# Patient Record
Sex: MALE | Race: WHITE
[De-identification: names, ages, dates, MRNs, and addresses within clinical notes are randomized per-mention and may not be internally consistent; named-entity substitution may affect disease eponyms.]

---

## 2017-12-29 ENCOUNTER — HOSPITAL ENCOUNTER (OUTPATIENT)
Age: 57
End: 2017-12-29
Payer: COMMERCIAL

## 2017-12-29 DIAGNOSIS — R94.31: ICD-10-CM

## 2017-12-29 DIAGNOSIS — I10: Primary | ICD-10-CM

## 2017-12-29 DIAGNOSIS — R06.02: Primary | ICD-10-CM

## 2017-12-29 DIAGNOSIS — I10: ICD-10-CM

## 2017-12-29 PROCEDURE — 93017 CV STRESS TEST TRACING ONLY: CPT

## 2017-12-29 PROCEDURE — 93306 TTE W/DOPPLER COMPLETE: CPT

## 2018-08-07 ENCOUNTER — HOSPITAL ENCOUNTER (OUTPATIENT)
Age: 58
End: 2018-08-07
Payer: COMMERCIAL

## 2018-08-07 DIAGNOSIS — R05: Primary | ICD-10-CM

## 2018-08-07 PROCEDURE — 71046 X-RAY EXAM CHEST 2 VIEWS: CPT

## 2018-09-10 ENCOUNTER — ON CAMPUS - OUTPATIENT (OUTPATIENT)
Dept: RURAL HOSPITAL 6 | Facility: HOSPITAL | Age: 58
End: 2018-09-10

## 2018-09-10 DIAGNOSIS — Z12.11 ENCOUNTER FOR SCREENING FOR MALIGNANT NEOPLASM OF COLON: ICD-10-CM

## 2018-09-10 DIAGNOSIS — K64.0 FIRST DEGREE HEMORRHOIDS: ICD-10-CM

## 2018-09-10 DIAGNOSIS — K63.5 POLYP OF COLON: ICD-10-CM

## 2018-09-10 DIAGNOSIS — K57.90 DIVERTICULOSIS OF INTESTINE, PART UNSPECIFIED, WITHOUT PERFO: ICD-10-CM

## 2018-09-10 PROCEDURE — 45385 COLONOSCOPY W/LESION REMOVAL: CPT | Mod: 33 | Performed by: INTERNAL MEDICINE

## 2023-05-16 ENCOUNTER — HOSPITAL ENCOUNTER (OUTPATIENT)
Age: 63
End: 2023-05-16
Payer: COMMERCIAL

## 2023-05-16 DIAGNOSIS — M54.50: Primary | ICD-10-CM

## 2023-05-16 DIAGNOSIS — M54.32: ICD-10-CM

## 2023-05-16 PROCEDURE — 76376 3D RENDER W/INTRP POSTPROCES: CPT

## 2023-05-16 PROCEDURE — 72148 MRI LUMBAR SPINE W/O DYE: CPT

## 2023-06-14 ENCOUNTER — PREP FOR SURGERY (OUTPATIENT)
Dept: OTHER | Facility: HOSPITAL | Age: 63
End: 2023-06-14
Payer: COMMERCIAL

## 2023-06-14 ENCOUNTER — OFFICE VISIT (OUTPATIENT)
Dept: NEUROSURGERY | Facility: CLINIC | Age: 63
End: 2023-06-14
Payer: COMMERCIAL

## 2023-06-14 VITALS — HEIGHT: 69 IN | TEMPERATURE: 97.3 F | BODY MASS INDEX: 33.8 KG/M2 | WEIGHT: 228.2 LBS

## 2023-06-14 DIAGNOSIS — M48.062 SPINAL STENOSIS OF LUMBAR REGION WITH NEUROGENIC CLAUDICATION: Primary | ICD-10-CM

## 2023-06-14 DIAGNOSIS — M48.062 LUMBAR STENOSIS WITH NEUROGENIC CLAUDICATION: Primary | ICD-10-CM

## 2023-06-14 DIAGNOSIS — M51.36 DDD (DEGENERATIVE DISC DISEASE), LUMBAR: ICD-10-CM

## 2023-06-14 PROCEDURE — 99204 OFFICE O/P NEW MOD 45 MIN: CPT | Performed by: NEUROLOGICAL SURGERY

## 2023-06-14 RX ORDER — CEFAZOLIN SODIUM 2 G/100ML
2 INJECTION, SOLUTION INTRAVENOUS ONCE
OUTPATIENT
Start: 2023-06-14 | End: 2023-06-14

## 2023-06-14 RX ORDER — OXYCODONE HCL 10 MG/1
10 TABLET, FILM COATED, EXTENDED RELEASE ORAL ONCE
OUTPATIENT
Start: 2023-06-14 | End: 2023-06-14

## 2023-06-14 RX ORDER — IBUPROFEN 200 MG
200 TABLET ORAL EVERY 6 HOURS
COMMUNITY

## 2023-06-14 RX ORDER — CHLORHEXIDINE GLUCONATE 4 G/100ML
SOLUTION TOPICAL
Qty: 120 ML | Refills: 0 | Status: SHIPPED | OUTPATIENT
Start: 2023-06-14

## 2023-06-14 RX ORDER — FAMOTIDINE 20 MG/1
20 TABLET, FILM COATED ORAL
OUTPATIENT
Start: 2023-06-14

## 2023-06-14 RX ORDER — SILDENAFIL CITRATE 20 MG/1
20 TABLET ORAL
COMMUNITY

## 2023-06-14 RX ORDER — ACETAMINOPHEN 500 MG
1000 TABLET ORAL ONCE
OUTPATIENT
Start: 2023-06-14 | End: 2023-06-14

## 2023-06-14 RX ORDER — IBUPROFEN 800 MG/1
800 TABLET ORAL ONCE
OUTPATIENT
Start: 2023-06-14 | End: 2023-06-14

## 2023-06-14 RX ORDER — OMEPRAZOLE 20 MG/1
CAPSULE, DELAYED RELEASE ORAL EVERY 24 HOURS
COMMUNITY

## 2023-06-14 NOTE — H&P
Patient: Chris Enciso  : 1960     Primary Care Provider: Bethany Jones MD     Requesting Provider: As above           History     Chief Complaint: Low back and bilateral lower extremity pain with walking and standing intolerance.     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  In  he underwent some sort of lumbar intervention by Dr. Wilber Calderon.  He has had occasional low back pain.  From December through March of this year he had a severe cough.  He also traveled a long distance by car and then returned.  By the next day on  of this year he had profound pain in his back extending into his legs.  Symptoms extend into the posterior lateral aspects of his calves to the ankles.  He has done several weeks of physical therapy to no avail.  He is better sitting, lying down, leaning forward.  He is worse with standing or walking.  Occasionally has some numbness in his legs or in his left hand.  He has taken Tylenol.  Patient is very limited in his struggling.     Review of Systems   Constitutional:  Negative for activity change, appetite change, chills, diaphoresis, fatigue, fever and unexpected weight change.   HENT:  Negative for congestion, dental problem, drooling, ear discharge, ear pain, facial swelling, hearing loss, mouth sores, nosebleeds, postnasal drip, rhinorrhea, sinus pressure, sinus pain, sneezing, sore throat, tinnitus, trouble swallowing and voice change.    Eyes:  Negative for photophobia, pain, discharge, redness, itching and visual disturbance.   Respiratory:  Negative for apnea, cough, choking, chest tightness, shortness of breath, wheezing and stridor.    Cardiovascular:  Negative for chest pain, palpitations and leg swelling.   Gastrointestinal:  Negative for abdominal distention, abdominal pain, anal bleeding, blood in stool, constipation, diarrhea, nausea, rectal pain and vomiting.   Endocrine: Negative for  cold intolerance, heat intolerance, polydipsia, polyphagia and polyuria.   Genitourinary:  Negative for decreased urine volume, difficulty urinating, dysuria, enuresis, flank pain, frequency, genital sores, hematuria, penile discharge, penile pain, penile swelling, scrotal swelling, testicular pain and urgency.   Musculoskeletal:  Positive for back pain. Negative for arthralgias, gait problem, joint swelling, myalgias, neck pain and neck stiffness.   Skin:  Negative for color change, pallor, rash and wound.   Allergic/Immunologic: Negative for environmental allergies, food allergies and immunocompromised state.   Neurological:  Negative for dizziness, tremors, seizures, syncope, facial asymmetry, speech difficulty, weakness, light-headedness, numbness and headaches.   Hematological:  Negative for adenopathy. Does not bruise/bleed easily.   Psychiatric/Behavioral:  Negative for agitation, behavioral problems, confusion, decreased concentration, dysphoric mood, hallucinations, self-injury, sleep disturbance and suicidal ideas. The patient is not nervous/anxious and is not hyperactive.       The patient's past medical history, past surgical history, family history, and social history have been reviewed at length in the electronic medical record.  Past Medical History:   Diagnosis Date   • Low back pain 1998    Most severe March 17 2023   • Lumbosacral disc disease 1998     Past Surgical History:   Procedure Laterality Date   • BACK SURGERY  1998 1998 - Dr. Calderon?     Family History   Problem Relation Age of Onset   • Diabetes Mother    • Stroke Father      Social History     Socioeconomic History   • Marital status:    Tobacco Use   • Smoking status: Never   Substance and Sexual Activity   • Alcohol use: Not Currently     Alcohol/week: 4.0 standard drinks     Types: 2 Cans of beer, 2 Shots of liquor per week   • Drug use: Never   • Sexual activity: Yes     Partners: Female     Birth control/protection:  "Hysterectomy           Allergies   Allergen Reactions   • Morphine Itching       Current Outpatient Medications on File Prior to Visit   Medication Sig Dispense Refill   • ibuprofen (ADVIL,MOTRIN) 200 MG tablet 1 tablet Every 6 (Six) Hours.     • omeprazole (priLOSEC) 20 MG capsule Daily.     • sildenafil (REVATIO) 20 MG tablet 1 tablet.       No current facility-administered medications on file prior to visit.            Physical Exam:   Temp 97.3 °F (36.3 °C) (Infrared)   Ht 175.3 cm (69\")   Wt 104 kg (228 lb 3.2 oz)   BMI 33.70 kg/m²   CONSTITUTIONAL: Patient is well-nourished, pleasant and appears stated age.  MUSCULOSKELETAL:  Straight leg raising is negative.  Brian's Sign is negative.  ROM in the low back is normal.  Tenderness in the back to palpation is not observed.  NEUROLOGICAL:  Orientation, memory, attention span, language function, and cognition have been examined and are intact.  Strength is intact in the lower extremities to direct testing.  Muscle tone is normal throughout.  Station and gait are normal.  Sensation is intact to light touch testing throughout.  Deep tendon reflexes are 1+ and symmetrical.  Coordination is intact.           Medical Decision Making     Data Review:   (All imaging studies were personally reviewed unless stated otherwise)  MRI of the lumbar spine dated 5/16/2023 demonstrates some mild degenerative disc disease and facet arthropathy.  There is moderate stenosis at L3-4 and high-grade stenosis at L4-5.     Diagnosis:   Lumbar stenosis with neurogenic claudication.     Treatment Options:   I have discussed treatment options with the patient and his wife.  Ultimately we have elected to pursue decompressive laminectomies at L3-4 and L4-5.  The nature of the procedure as well as the potential risks, complications, limitations, and alternatives to the procedure were discussed at length with the patient and the patient has agreed to proceed with surgery.          Diagnosis " Plan   1. Spinal stenosis of lumbar region with neurogenic claudication          2. DDD (degenerative disc disease), lumbar

## 2023-06-14 NOTE — PROGRESS NOTES
Patient: Chris Enciso  : 1960    Primary Care Provider: Bethany Jones MD    Requesting Provider: As above        History    Chief Complaint: Low back and bilateral lower extremity pain with walking and standing intolerance.    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  In  he underwent some sort of lumbar intervention by Dr. Wilber Calderon.  He has had occasional low back pain.  From December through March of this year he had a severe cough.  He also traveled a long distance by car and then returned.  By the next day on  of this year he had profound pain in his back extending into his legs.  Symptoms extend into the posterior lateral aspects of his calves to the ankles.  He has done several weeks of physical therapy to no avail.  He is better sitting, lying down, leaning forward.  He is worse with standing or walking.  Occasionally has some numbness in his legs or in his left hand.  He has taken Tylenol.  Patient is very limited in his struggling.    Review of Systems   Constitutional:  Negative for activity change, appetite change, chills, diaphoresis, fatigue, fever and unexpected weight change.   HENT:  Negative for congestion, dental problem, drooling, ear discharge, ear pain, facial swelling, hearing loss, mouth sores, nosebleeds, postnasal drip, rhinorrhea, sinus pressure, sinus pain, sneezing, sore throat, tinnitus, trouble swallowing and voice change.    Eyes:  Negative for photophobia, pain, discharge, redness, itching and visual disturbance.   Respiratory:  Negative for apnea, cough, choking, chest tightness, shortness of breath, wheezing and stridor.    Cardiovascular:  Negative for chest pain, palpitations and leg swelling.   Gastrointestinal:  Negative for abdominal distention, abdominal pain, anal bleeding, blood in stool, constipation, diarrhea, nausea, rectal pain and vomiting.   Endocrine: Negative for cold  "intolerance, heat intolerance, polydipsia, polyphagia and polyuria.   Genitourinary:  Negative for decreased urine volume, difficulty urinating, dysuria, enuresis, flank pain, frequency, genital sores, hematuria, penile discharge, penile pain, penile swelling, scrotal swelling, testicular pain and urgency.   Musculoskeletal:  Positive for back pain. Negative for arthralgias, gait problem, joint swelling, myalgias, neck pain and neck stiffness.   Skin:  Negative for color change, pallor, rash and wound.   Allergic/Immunologic: Negative for environmental allergies, food allergies and immunocompromised state.   Neurological:  Negative for dizziness, tremors, seizures, syncope, facial asymmetry, speech difficulty, weakness, light-headedness, numbness and headaches.   Hematological:  Negative for adenopathy. Does not bruise/bleed easily.   Psychiatric/Behavioral:  Negative for agitation, behavioral problems, confusion, decreased concentration, dysphoric mood, hallucinations, self-injury, sleep disturbance and suicidal ideas. The patient is not nervous/anxious and is not hyperactive.      The patient's past medical history, past surgical history, family history, and social history have been reviewed at length in the electronic medical record.      Physical Exam:   Temp 97.3 °F (36.3 °C) (Infrared)   Ht 175.3 cm (69\")   Wt 104 kg (228 lb 3.2 oz)   BMI 33.70 kg/m²   CONSTITUTIONAL: Patient is well-nourished, pleasant and appears stated age.  MUSCULOSKELETAL:  Straight leg raising is negative.  Brian's Sign is negative.  ROM in the low back is normal.  Tenderness in the back to palpation is not observed.  NEUROLOGICAL:  Orientation, memory, attention span, language function, and cognition have been examined and are intact.  Strength is intact in the lower extremities to direct testing.  Muscle tone is normal throughout.  Station and gait are normal.  Sensation is intact to light touch testing throughout.  Deep tendon " reflexes are 1+ and symmetrical.  Coordination is intact.        Medical Decision Making    Data Review:   (All imaging studies were personally reviewed unless stated otherwise)  MRI of the lumbar spine dated 5/16/2023 demonstrates some mild degenerative disc disease and facet arthropathy.  There is moderate stenosis at L3-4 and high-grade stenosis at L4-5.    Diagnosis:   Lumbar stenosis with neurogenic claudication.    Treatment Options:   I have discussed treatment options with the patient and his wife.  Ultimately we have elected to pursue decompressive laminectomies at L3-4 and L4-5.  The nature of the procedure as well as the potential risks, complications, limitations, and alternatives to the procedure were discussed at length with the patient and the patient has agreed to proceed with surgery.       Diagnosis Plan   1. Spinal stenosis of lumbar region with neurogenic claudication        2. DDD (degenerative disc disease), lumbar            Scribed for John Benton MD by Mary Carmen Cadena QUINTIN 6/14/2023 14:10 EDT      I, Dr. Benton, personally performed the services described in the documentation, as scribed in my presence, and it is both accurate and complete.

## 2023-07-25 ENCOUNTER — OFFICE VISIT (OUTPATIENT)
Dept: NEUROSURGERY | Facility: CLINIC | Age: 63
End: 2023-07-25
Payer: COMMERCIAL

## 2023-07-25 VITALS
DIASTOLIC BLOOD PRESSURE: 90 MMHG | HEIGHT: 69 IN | TEMPERATURE: 98 F | BODY MASS INDEX: 33.62 KG/M2 | WEIGHT: 227 LBS | SYSTOLIC BLOOD PRESSURE: 138 MMHG

## 2023-07-25 DIAGNOSIS — M48.062 SPINAL STENOSIS OF LUMBAR REGION WITH NEUROGENIC CLAUDICATION: Primary | ICD-10-CM

## 2023-07-25 DIAGNOSIS — M51.36 DDD (DEGENERATIVE DISC DISEASE), LUMBAR: ICD-10-CM

## 2023-07-25 PROCEDURE — 99024 POSTOP FOLLOW-UP VISIT: CPT | Performed by: PHYSICIAN ASSISTANT

## 2023-07-25 NOTE — PROGRESS NOTES
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  His history is significant for undergoing some sort of lumbar intervention in  by Dr. iWlber Calderon.  More recently, he presented to our clinic with back and lower extremity pain with walking and standing intolerance.  Preoperative studies demonstrated high-grade lumbar stenosis.  As such on 7/10/2023 he underwent decompressive laminectomies at L3-4 and L4-5.  A small dural rent was encountered intraoperatively and closed primarily and patient was kept at bedrest for a few days.    Today patient is 2 weeks postop and presents for suture removal.  Overall he is doing well.  He complains of right hip pain today.  He has been increasingly walking.  He has had some low-grade headache; nonpositional.  No incisional issues.  He has inquired about riding his motorcycle.          Past Medical History:   Diagnosis Date    GERD (gastroesophageal reflux disease)     History of TB (tuberculosis)     45 years ago, treated with meds    Low back pain 1998    Most severe 2023    Lumbosacral disc disease          Current Outpatient Medications:     ibuprofen (ADVIL,MOTRIN) 200 MG tablet, 1 tablet Every 6 (Six) Hours. Not currently taking, Disp: , Rfl:     omeprazole (priLOSEC) 20 MG capsule, 1 capsule Daily As Needed., Disp: , Rfl:     oxyCODONE-acetaminophen (PERCOCET) 5-325 MG per tablet, Take 1 tablet by mouth 3 (Three) Times a Day As Needed (Pain)., Disp: 20 tablet, Rfl: 0    sildenafil (REVATIO) 20 MG tablet, 1 tablet., Disp: , Rfl:     Past Surgical History:   Procedure Laterality Date    BACK SURGERY  1998 - Dr. Calderon?  (Lumbar)    CHOLECYSTECTOMY      COLONOSCOPY      LUMBAR LAMINECTOMY DISCECTOMY DECOMPRESSION N/A 7/10/2023    Procedure: LUMBAR  "LAMINECTOMY DISCECTOMY DECOMPRESSION POSTERIOR L3-L5;  Surgeon: John Benton MD;  Location: [facility];  Service: Neurosurgery;  Laterality: N/A;       Social History     Socioeconomic History    Marital status:    Tobacco Use    Smoking status: Never    Smokeless tobacco: Never   Vaping Use    Vaping Use: Never used   Substance and Sexual Activity    Alcohol use: Not Currently     Alcohol/week: 4.0 standard drinks     Types: 2 Cans of beer, 2 Shots of liquor per week    Drug use: Never    Sexual activity: Defer     Partners: Female     Birth control/protection: Hysterectomy         Review of Systems   Musculoskeletal:  Positive for joint swelling.     Objective   Vital Signs: Blood pressure 138/90, temperature 98 °F (36.7 °C), temperature source Infrared, height 175.3 cm (69\"), weight 103 kg (227 lb).  Physical Exam  Vitals and nursing note reviewed.   Constitutional:       General: He is not in acute distress.     Appearance: He is well-developed.   HENT:      Head: Normocephalic and atraumatic.   Skin:     Comments: Nylon sutures were removed in a clean fashion.  Incision remains intact and healing very nicely.   Psychiatric:         Behavior: Behavior normal.         Thought Content: Thought content normal.         Assessment & Plan   Diagnosis: Lumbar stenosis with neurogenic claudication status post laminectomies L3-4 and L4-5    Medical Decision Making: Patient is 2 weeks postop and doing well.  Sutures were removed and I discussed further wound care instructions. He complains of some right hip pain today.  He would like to return to work next week.  We went over some do's and don'ts in that regard.  He is increasingly walking.  Plan to see him back in 6 weeks to check his progress.  Call the office in the interim with any questions or concerns.    Diagnoses and all orders for this visit:    1. Spinal stenosis of lumbar region with neurogenic claudication (Primary)    2. DDD (degenerative disc " disease), lumbar                        Maggie Roberts PA-C  Patient Care Team:  Bethany Jones MD as PCP - General (Family Medicine)  Bethany Jones MD as Referring Physician (Family Medicine)            Answers submitted by the patient for this visit:  Primary Reason for Visit (Submitted on 7/19/2023)  What is the primary reason for your visit?: Other  Other (Submitted on 7/19/2023)  Please describe your symptoms.: Post OP   - 10 Days / suture removal & wound check  Have you had these symptoms before?: No  How long have you been having these symptoms?: Greater than 2 weeks  Please list any medications you are currently taking for this condition.: None  Please describe any probable cause for these symptoms. : Follow up to surgery on July 10th.

## 2023-07-31 ENCOUNTER — TELEPHONE (OUTPATIENT)
Dept: NEUROSURGERY | Facility: OTHER | Age: 63
End: 2023-07-31

## 2023-07-31 NOTE — TELEPHONE ENCOUNTER
Caller: YEISON YANEZ    Relationship: Emergency Contact    Best call back number: [phone]    What form or medical record are you requesting: RETURN TO WORK NOTE    Who is requesting this form or medical record from you: PATIENT/ EMPLOYER    How would you like to receive the form or medical records (pick-up, mail, fax): FAX  If fax, what is the fax number: [phone]  location details    Timeframe paperwork needed: ASAP    Additional notes: PATIENTS WIFE CALLED TO EXTEND RETURN TO WORK TIME FRAME- PATIENT IS SUPPOSED TO RETURN ON 08/02/23 AND HE IS NOT FEELING READY TO GO BACK JUST YET SO THEY ARE INQUIRING IF HE CAN RETURN ON 08/11/2023- PLEASE ADVISE - THANK YOU.

## 2023-09-05 ENCOUNTER — OFFICE VISIT (OUTPATIENT)
Dept: NEUROSURGERY | Facility: CLINIC | Age: 63
End: 2023-09-05
Payer: COMMERCIAL

## 2023-09-05 VITALS — TEMPERATURE: 97.1 F | WEIGHT: 232 LBS | BODY MASS INDEX: 35.16 KG/M2 | HEIGHT: 68 IN

## 2023-09-05 DIAGNOSIS — Z98.890 S/P LUMBAR LAMINECTOMY: Primary | ICD-10-CM

## 2023-09-05 DIAGNOSIS — M48.062 SPINAL STENOSIS OF LUMBAR REGION WITH NEUROGENIC CLAUDICATION: ICD-10-CM

## 2023-09-05 PROCEDURE — 99024 POSTOP FOLLOW-UP VISIT: CPT | Performed by: NEUROLOGICAL SURGERY

## 2023-09-05 NOTE — PROGRESS NOTES
Patient: Chris Enciso  : 1960    Primary Care Provider: Bethany Jones MD    Requesting Provider: As above        History    Chief Complaint: Lumbar stenosis with neurogenic claudication.    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  His history is significant for undergoing some sort of lumbar intervention in  by Dr. Wilber Calderon.  More recently, he presented to our clinic with back and lower extremity pain with walking and standing intolerance.  Preoperative studies demonstrated high-grade lumbar stenosis.  As such on 7/10/2023 he underwent decompressive laminectomies at L3-4 and L4-5.  A small dural rent was encountered intraoperatively and closed primarily and patient was kept at bedrest for a few days.  For a while he was having bothersome pain in the right leg but that is markedly dissipated.  He has a little bit of numbness in the right thigh.  He has been walking 10,000-11,000 steps a day.  Overall he is pleased with his progress.    Review of Systems   Constitutional:  Negative for activity change, appetite change, chills, diaphoresis, fatigue, fever and unexpected weight change.   HENT:  Negative for congestion, dental problem, drooling, ear discharge, ear pain, facial swelling, hearing loss, mouth sores, nosebleeds, postnasal drip, rhinorrhea, sinus pressure, sinus pain, sneezing, sore throat, tinnitus, trouble swallowing and voice change.    Eyes:  Negative for photophobia, pain, discharge, redness, itching and visual disturbance.   Respiratory:  Negative for apnea, cough, choking, chest tightness, shortness of breath, wheezing and stridor.    Cardiovascular:  Negative for chest pain, palpitations and leg swelling.   Gastrointestinal:  Negative for abdominal distention, abdominal pain, anal bleeding, blood in stool, constipation, diarrhea, nausea, rectal pain and vomiting.   Endocrine: Negative for cold intolerance, heat  "intolerance, polydipsia, polyphagia and polyuria.   Genitourinary:  Negative for decreased urine volume, difficulty urinating, dysuria, enuresis, flank pain, frequency, genital sores, hematuria, penile discharge, penile pain, penile swelling, scrotal swelling, testicular pain and urgency.   Musculoskeletal:  Negative for arthralgias, back pain, gait problem, joint swelling, myalgias, neck pain and neck stiffness.   Skin:  Negative for color change, pallor, rash and wound.   Allergic/Immunologic: Negative for environmental allergies, food allergies and immunocompromised state.   Neurological:  Positive for numbness. Negative for dizziness, tremors, seizures, syncope, facial asymmetry, speech difficulty, weakness, light-headedness and headaches.   Hematological:  Negative for adenopathy. Does not bruise/bleed easily.   Psychiatric/Behavioral:  Negative for agitation, behavioral problems, confusion, decreased concentration, dysphoric mood, hallucinations, self-injury, sleep disturbance and suicidal ideas. The patient is not nervous/anxious and is not hyperactive.      The patient's past medical history, past surgical history, family history, and social history have been reviewed at length in the electronic medical record.      Physical Exam:   Temp 97.1 °F (36.2 °C) (Infrared)   Ht 172.7 cm (68\")   Wt 105 kg (232 lb)   BMI 35.28 kg/m²   Lumbar wound looks great.    Medical Decision Making    Diagnosis:   Lumbar stenosis with neurogenic claudication status post decompressive laminectomies.    Treatment Options:   The patient is doing well.  He will steadily increase his activities over the next 2 to 3 months to get back into a normal range.  He will follow-up in our clinic in approximately 2.5 months for what will likely be a final visit.       Diagnosis Plan   1. S/P lumbar laminectomy        2. Spinal stenosis of lumbar region with neurogenic claudication            Scribed for John Benton MD by Mary Carmen" [facility] 9/5/2023 11:33 EDT      I, Dr. Benton, personally performed the services described in the documentation, as scribed in my presence, and it is both accurate and complete.

## 2023-11-28 ENCOUNTER — OFFICE VISIT (OUTPATIENT)
Dept: NEUROSURGERY | Facility: CLINIC | Age: 63
End: 2023-11-28
Payer: COMMERCIAL

## 2023-11-28 VITALS
BODY MASS INDEX: 34.42 KG/M2 | HEART RATE: 91 BPM | OXYGEN SATURATION: 98 % | TEMPERATURE: 96.9 F | WEIGHT: 232.4 LBS | HEIGHT: 69 IN

## 2023-11-28 DIAGNOSIS — M48.062 SPINAL STENOSIS OF LUMBAR REGION WITH NEUROGENIC CLAUDICATION: ICD-10-CM

## 2023-11-28 DIAGNOSIS — Z98.890 S/P LUMBAR LAMINECTOMY: Primary | ICD-10-CM

## 2023-11-28 PROCEDURE — 99024 POSTOP FOLLOW-UP VISIT: CPT

## 2023-11-28 NOTE — PROGRESS NOTES
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  His history is significant for undergoing some sort of lumbar intervention in  by Dr. Wilber Calderon.  More recently, he presented to our clinic with back and lower extremity pain with walking and standing intolerance.  Preoperative studies demonstrated high-grade lumbar stenosis.  As such on 7/10/2023 he underwent decompressive laminectomies at L3-4 and L4-5.  A small dural rent was encountered intraoperatively and closed primarily and patient was kept at bedrest for a few days.  In terms of the above-mentioned, he is doing very well.  He is able to walk and stand for longer periods of time.  He continues walking very extensively.  At times, there is some bothersome pain in the right leg but this comes and goes.  He notes that this is tolerable.       Review of Systems:   Review of Systems    The patient's past medical history, past surgical history, family history, and social history have been reviewed at length in the electronic medical record.       Past Medical History:   Past Medical History:   Diagnosis Date    GERD (gastroesophageal reflux disease)     History of TB (tuberculosis)     45 years ago, treated with meds    Low back pain     Most severe 2023    Lumbosacral disc disease        Past Surgical History:   Past Surgical History:   Procedure Laterality Date    BACK SURGERY  1998 - Dr. Calderon?  (Lumbar)    CHOLECYSTECTOMY      COLONOSCOPY      LUMBAR LAMINECTOMY DISCECTOMY DECOMPRESSION N/A 7/10/2023    Procedure: LUMBAR LAMINECTOMY DISCECTOMY DECOMPRESSION POSTERIOR L3-L5;  Surgeon: John Benton MD;  Location: [facility];  Service: Neurosurgery;  Laterality: N/A;       Family  "History:   Family History   Problem Relation Age of Onset    Diabetes Mother     Stroke Father     Cancer Sister         Pancreatis    Cancer Brother         Lung       Social History:   Social History     Socioeconomic History    Marital status:    Tobacco Use    Smoking status: Never    Smokeless tobacco: Never   Vaping Use    Vaping Use: Never used   Substance and Sexual Activity    Alcohol use: Not Currently     Alcohol/week: 4.0 standard drinks of alcohol     Types: 2 Cans of beer, 2 Shots of liquor per week    Drug use: Never    Sexual activity: Yes     Partners: Female     Birth control/protection: Hysterectomy       Immunizations:   There is no immunization history on file for this patient.     Medications:     Current Outpatient Medications:     ibuprofen (ADVIL,MOTRIN) 200 MG tablet, 1 tablet Every 6 (Six) Hours. Not currently taking, Disp: , Rfl:     omeprazole (priLOSEC) 20 MG capsule, 1 capsule Daily As Needed., Disp: , Rfl:     sildenafil (REVATIO) 20 MG tablet, 1 tablet., Disp: , Rfl:     Allergies:   Allergies   Allergen Reactions    Morphine Itching, Nausea And Vomiting and Rash       Objective     Vital Signs  Pulse 91   Temp 96.9 °F (36.1 °C) (Temporal)   Ht 175.3 cm (69\")   Wt 105 kg (232 lb 6.4 oz)   SpO2 98%   BMI 34.32 kg/m²   Estimated body mass index is 34.32 kg/m² as calculated from the following:    Height as of this encounter: 175.3 cm (69\").    Weight as of this encounter: 105 kg (232 lb 6.4 oz).    Physical Exam   Constitutional: Patient is well-developed and appears stated age. Pleasant and   cooperative. Appears in no acute distress.   Incision: Well-healed lumbar incision.  Patient moves about comfortably.    Tobacco Use: Low Risk  (11/28/2023)    Patient History     Smoking Tobacco Use: Never     Smokeless Tobacco Use: Never     Passive Exposure: Not on file        Body mass index is 34.32 kg/m².    Fall Risk Assessment was completed, and patient is at low risk for " falls.        Assessment and Plan     Medical Decision Making     Diagnosis:  Lumbar stenosis with neurogenic claudication status post decompressive laminectomies.    Treatment Options:   Mr. Enciso continues to do well in terms to his surgery and preoperative symptoms.  He continues walking extensively and working.  The pain in the right leg and thigh has been more bothersome of late.  I discussed multiple options going forward with him.  He is agreeable to possibly pursuing a round of epidural steroid injections with Dr. Arizmendi.  He will follow-up once injections have been completed to check on his progress.       Diagnosis Plan   1. S/P lumbar laminectomy  Ambulatory Referral to Pain Management Clinic      2. Spinal stenosis of lumbar region with neurogenic claudication                Adithya Bess PA-C  [facility]  [address] [address] [phone]

## 2024-01-31 NOTE — PROGRESS NOTES
"Chief Complaint: \"Pain in my lower back and legs. I've been pretty good for the past week.\"      History of Present Illness:   Patient: Mr. Chris Enciso, 63 y.o. male   Referring Physician: Adithya Bess PA-C   Reason for Referral: Consultation for chronic intractable lower back pain.   Pain History: Patient reports a longstanding history of chronic intractable lower back pain. Chris Enciso underwent lumbar surgery with Dr. Wilber Calderon in 1998. More recently, he presented with increasing lower back and lower extremity pain associated with severe neurogenic claudication. MRI revealed severe lumbar stenosis.  On 07/10/2023, Chris Enciso underwent lumbar decompressive laminectomies at L3-L4 and L4-L5. A small dural rent was encountered intraoperatively and closed primarily. The patient was kept at bedrest for a few days. After surgery, he was able to stand and walk for longer periods of time. His left lower extremity resolved. Unfortunately, he started experiencing right lower extremity pain. Chris Enciso underwent neurosurgical consultation with Adithya Bess PA-C on 11/28/2023, and was found not to be a surgical candidate. Adithya recommended interventional pain management measures with future follow up once completed. Chris Enciso reports that he did not undergo any diagnostic studies of the lumbar region after his last surgery.  Chris Enciso has failed to obtain pain relief with conservative measures for more than 5 months limited to OTC oral analgesics. Pain has improved over the past week.  Pain Description: Constant pain with intermittent exacerbation, described as aching, dull, burning, shooting, and numbing sensation.   Radiation of Pain: The pain radiates into the lateral aspect of the right leg to his foot  Pain intensity today: 0/10   Average pain intensity last week: 3/10  Pain intensity ranges from: 3/10 to 5/10  Aggravating factors: Pain increases with lifting, pushing, bending forward, " twisting, protracted sitting. Patient denies neurogenic claudication.    Alleviating factors: Pain decreases with sitting down, sitting on a recliner, lying down  Associated Symptoms:   Patient reports pain, numbness, and weakness in the right lower extremity. Patient denies symptoms in the opposite limb  Patient denies any new bladder or bowel problems.   Patient reports difficulties with his balance  but denies recent falls.   Pain interfered with ADLs, general activities (ability to walk, stand, transition from different positions), and affects patient's quality of life  Pain does not interfere with sleep  Muscle spasms: No  Stiffness: LB    Review of previous therapies and additional medical records:  Chris Enciso has already failed the following measures, including:   Conservative Measures: Oral analgesics  Interventional Measures: None  Surgical Measures: No history of previous hip surgery.   1998: Lumbar surgery, Dr. Calderon.   07/10/2023: Lumbar laminectomies L3-L4 and L4-L5 by Dr Benton  Chris Enciso underwent neurosurgical consultation with Adithya Bess PA-C on 11/28/2023, and was found not to be a surgical candidate.  Chris Enciso is a healthy adult other than for chronic pain   In terms of current analgesics, Chris Enciso takes: ibuprofen  I have reviewed Maurilio Report consistent with medication reconciliation.  SOAPP/ORT: Low Risk     PHQ-2 Depression Screening  Little interest or pleasure in doing things? 0-->not at all   Feeling down, depressed, or hopeless? 0-->not at all   PHQ-2 Total Score 0     Pain Self-Efficacy Questionnaire (PSEQ)  ITEM 02-01 2024        I can enjoy things despite the pain. 3        I can do most of the household chores (tidying up, washing dishes, etc), despite the pain. 4        I can socialize with my friends or family members as often as I used to do, despite the pain. 6        I can cope with my pain in most situations. 6        I can do some form of work,  despite the pain (includes housework, paid, and unpaid work). 6        I can still do many of the things I enjoy doing, such as hobbies or leisure activity despite pain. 5        I can cope with my pain without medications. 3        I can accomplish most of my goals in life despite the pain. 5        I can live in a normal lifestyle, despite the pain. 5        I can gradually become more active, despite the pain. 4        TOTAL SCORE 47/60            Global Pain Scale 02-01 2024          Pain 5          Feelings 2          Clinical outcomes 8          Activities 8          GPS Total: 23            The [place] Back Pain Disability Scale   DATE 02-01 2024          Sleep through the night 1          Turn over in bed 2          Get out of bed 1          Make your bed 1          Put on socks (pantyhose) 2          Ride in a car 0          Sit in a chair for several hours 1          Stand up for 20-30 minutes 2          Climb one flight of stairs 1          Walk a few blocks (200-300 yards)  1          Walk several miles 2          Run one block (about 50 yards) 3          Take food out of the refrigerator 0          Reach up to high shelves 1          Move a chair 1          Pull or push heavy doors 0          Bend over to clean the bathtub 2          Throw a ball 3          Carry two bags of groceries 0          Lift and carry a heavy suitcase 1          Total score 25            [place] Claudication Score  All questions are in reference to an average for the past month 02-01 2024         PAIN FREQUENCY:   How often have you experienced pain in your back or buttock or pain that goes down your back, buttock, and/or legs?  Not at all  Less than once a week  At least once a week  Every day for at least a few minutes  Every day for most of the day  Every minute of the day 4         PAIN SEVERITY:   How would you describe the worst pain you have had  in your back, buttock, and/or legs?  0. None  1. Mild  2. Moderate  3.  Severe  4. Very severe  5. Intolerable 3         BACK PAIN SEVERITY:   How would you describe the pain or discomfort in your back and/or buttocks?  0. None  1. Mild  2. Moderate  3. Severe  4. Very severe  5. Intolerable 3         LEG PAIN SEVERITY:   How would you describe the pain or discomfort in your legs or feet?  None  Mild  Moderate  Severe  Very severe  Intolerable 3         NERVE SYMPTOM SEVERITY:   How would you describe the numbness or tingling in your legs or feet?  None  Mild  Moderate  Severe  Very severe  Intolerable 3         LEG WEAKNESS:   How would you describe the strength in your legs, ankles, or feet?  None  Mild  Moderate  Severe  Very severe  Intolerable 3         BALANCE:   Which statement best describes your steadiness when standing or walking?  0. I have had no problems with balance.  1. I sometimes feel off-balance, but I am able to walk without any aid.  2. I often feel off-balance, but I am able to walk with an aid.  3. I am able to walk without an aid.  4. I have difficulty walking despite using an aid.  5. I cannot stand up. 2         WALKING DISTANCE:   When you went for a walk, how far were you able to walk before your back or leg started giving trouble?  0. More than 2 miles or no limit  1. More than 1/4 mile but less than 2 miles  2. More than 100 yards but less than 1/4 mile  3. More than 50 feet but less than 100 yards  4. Less than 50 feet  5. Not at all 3         WALKING ABILITY:   Which statement best describes your walking ability?  0. There is no limit to my walking ability.  1. I can walk far enough to do everything I want to do.  2. I am able to walk comfortably from my home to the shops or my transport.  3. I am able to walk comfortably around the house.  4. I am able to walk only from the bedroom to the bathroom or kitchen.  5. I am not able to walk at all. 3         WALKING SPEED:   Which statement best describes your walking?  0. I am able to walk at a normal  speed.  1. I walk slowly standing upright.  2. I walk slowly bent forward.  3. I have to stop and stand still when I walk.  4. I have to stop and sit down when I walk.  5. I cannot walk at all. 3         Total Score 30/50                       Review of Diagnostic Studies:  There are no new diagnostic studies for review. MRI prior to last surgery: I have independently reviewed and interpreted the images with the patient and used the images and a tridimensional spine model to explain findings. I have also reviewed the report.  MRI of the lumbar spine without contrast on 6/16/2023 revealed preservation of vertebral body heights and alignment.  The distal cord and conus medullaris appear unremarkable.  Axial imaging:  L1-L2, L2-L3: Disc bulge, facet hypertrophy.  No significant canal or foraminal stenosis  L3-L4: Disc bulge, facet hypertrophy, prominence of epidural fat causing mild to moderate canal stenosis. No significant foraminal stenosis  L4-L5: Disc bulge, facet hypertrophy, ligamentum flavum hypertrophy contributing to severe stenosis of the spinal canal.  Mild left foraminal stenosis  L5-S1: Disc protrusion, facet arthrosis.  No significant canal or foraminal stenosis    Review of Systems   Constitutional:  Positive for fatigue.   Musculoskeletal:  Positive for arthralgias, back pain and myalgias.   All other systems reviewed and are negative.      Patient Active Problem List   Diagnosis    Lumbar stenosis with neurogenic claudication       Past Medical History:   Diagnosis Date    GERD (gastroesophageal reflux disease)     History of TB (tuberculosis)     45 years ago, treated with meds    Joint pain     Low back pain 1998    Most severe March 17 2023    Lumbosacral disc disease 1998         Past Surgical History:   Procedure Laterality Date    BACK SURGERY  1998 1998 - Dr. Calderon?  (Lumbar)    CHOLECYSTECTOMY  1999    COLONOSCOPY      LAMINECTOMY  July 2023    LUMBAR LAMINECTOMY DISCECTOMY DECOMPRESSION N/A  "07/10/2023    Procedure: LUMBAR LAMINECTOMY DISCECTOMY DECOMPRESSION POSTERIOR L3-L5;  Surgeon: John Bentno MD;  Location: [facility];  Service: Neurosurgery;  Laterality: N/A;         Family History   Problem Relation Age of Onset    Diabetes Mother     Stroke Father     Cancer Sister         Pancreatis    Cancer Brother         Lung         Social History     Socioeconomic History    Marital status:    Tobacco Use    Smoking status: Never    Smokeless tobacco: Never   Vaping Use    Vaping Use: Never used   Substance and Sexual Activity    Alcohol use: Not Currently     Alcohol/week: 4.0 standard drinks of alcohol     Types: 2 Cans of beer, 2 Shots of liquor per week     Comment: Beer rarely    Drug use: Never    Sexual activity: Yes     Partners: Female     Birth control/protection: Hysterectomy           Current Outpatient Medications:     ibuprofen (ADVIL,MOTRIN) 200 MG tablet, 1 tablet Every 6 (Six) Hours. Not currently taking, Disp: , Rfl:     sildenafil (REVATIO) 20 MG tablet, 1 tablet., Disp: , Rfl:       Allergies   Allergen Reactions    Morphine Itching, Nausea And Vomiting and Rash         /84 (BP Location: Right arm, Patient Position: Sitting, Cuff Size: Adult)   Pulse 70   Temp 97.5 °F (36.4 °C) (Infrared)   Ht 175.3 cm (69\")   Wt 105 kg (231 lb 3.2 oz)   SpO2 97%   BMI 34.14 kg/m²       Physical Exam:  Constitutional: Patient appears well-developed, well-nourished, well-hydrated, appears younger than stated age  HEENT: Head: Normocephalic and atraumatic  Eyes: Conjunctivae and lids are normal  Pupils: Equal, round, reactive to light    Peripheral vascular exam:  Femoral: right 2+, left 2+. Posterior tibialis: right 2+ and left 2+. Dorsalis pedis: right 2+ and left 2+. No edema.   Musculoskeletal   Gait and station: Gait evaluation demonstrated a normal gait. Able to walk on heels, toes, tandem walking   Lumbar Spine: Passive and active range of motion are limited but without " pain. Extension, flexion, lateral flexion, rotation of the lumbar spine did not increase or reproduce pain. Lumbar facet joint loading maneuvers are negative.   Sacroiliac Joints: Brian's test  Gaenslen's test  thigh thrust test  SI compression test,  posterior shear test,  SI distraction test  pelvic rock test,  Yeoman's test: Negative   Piriformis maneuvers: Negative   Right Hip Joint: The range of motion of the hip joint is almost full and without pain   Left Hip Joint: The range of motion of the hip joint is almost full and without pain   Palpation of the bilateral ischial tuberosities: Unrevealing   Palpation of the bilateral psoas tendons and iliopsoas bursas: Unrevealing   Palpation of the bilateral greater trochanters: Unrevealing   Examination of the Iliotibial band: Unrevealing   Neurological:   Patient is alert and oriented to person, place, and time.   Speech: Normal.   Cortical function: Normal mental status.   Reflex Scores:  Right patellar: 1+  Left patellar: 2+  Right Achilles: 1+  Left Achilles: 2+  Motor strength: 5/5  Motor Tone: Normal  Involuntary movements: None.   Superficial/Primitive Reflexes: Primitive reflexes were absent.   Right Rey: Absent  Left Rey: Absent  Right ankle clonus: Absent  Left ankle clonus: Absent   Babinsky: Absent  Long tract signs: Negative. Straight leg raising test: Negative. Femoral stretch sign: Negative.   Sensory exam: Intact to light touch, intact pain and temperature sensation, intact vibration sensation and normal proprioception  Coordination: Finger to nose: Normal. Balance: Normal Romberg's sign: Negative   Skin and subcutaneous tissue: Skin is warm and intact. No rash noted. No cyanosis.   Psychiatric: Judgment and insight: Normal. Recent and remote memory: Intact. Mood and affect: Normal.     ASSESSMENT:   1. Lumbar postlaminectomy syndrome    2. Lumbar stenosis with neurogenic claudication    3. Gait disturbance    4. Physical deconditioning       PLAN/MEDICAL DECISION MAKING:  Mr. Chris Enciso, 63 y.o. male presents with a longstanding history of lower back pain. Chris Enciso underwent lumbar surgery with Dr. Wilber Calderon in 1998. More recently, he presented with increasing lower back and lower extremity pain associated with severe neurogenic claudication. MRI revealed severe lumbar stenosis.  On 07/10/2023, Chris Enciso underwent lumbar decompressive laminectomies at L3-L4 and L4-L5. After surgery, he was able to stand and walk for longer periods of time. His left lower extremity resolved. Unfortunately, he started experiencing right lower extremity pain. Chris Enciso underwent neurosurgical consultation with Adithya Bess PA-C on 11/28/2023, and was found not to be a surgical candidate. Adithya recommended interventional pain management measures with follow up in the future once he completed pain management therapies. Chris Enciso reports that he did not undergo diagnostic studies of the lumbar region after his last surgery. Chris Enciso has failed to obtain pain relief with conservative measures for more than 5 months although limited to OTC oral analgesics. Also, he reports that his pain has improved over the past weeks. A comprehensive evaluation including history and physical exam along with pertinent physiologic and functional assessment was performed. Patient presents with intractable pain due to the diagnoses listed above. Patient has failed to respond to conservative modalities, as referenced under HPI.   I have documented the impact of patient's moderate-to-severe pain contributing to significant impairment in daily activities, ADLs, and a negative impact on the patient's quality of life, as reflected on Global Pain Scale 25/100; The [place] Back Pain Disability Scale 25/100; The [place] Claudication Score 30/50; Tinetti Gait & Balance Assessment Tool  (low risk for falls). I have reviewed pertinent supporting diagnostic  studies of patient's chronic pain condition as well as all available pertinent medical records to patient's chronic pain condition including previous therapies, as referenced above. PHQ-2 Depression Screening 0; Pain Self-Efficacy Questionnaire (PSEQ) 47/60. I had a lengthy conversation with . Chris Enciso regarding his chronic pain condition and potential therapeutic options including risks, benefits, alternative therapies, to name a few. We have discussed using a stepwise approach starting with the least intense level of care as determined by the extent required to diagnose and or treat a patient's condition. The proposed treatments are consistent with the patient's medical condition and known to be safe and effective by current guidelines and the standard of care. The duration and frequency proposed are considered appropriate for the service in accordance with accepted standards of medical practice for the diagnosis and treatment of the patient's condition and intended to improve the patient's level of function. These services will be furnished in a setting appropriate to the patient's medical needs and condition. Therefore, I have proposed the following plan:    1. Follow up: Patient will report to me directly via [employer] messaging to assess progress.      2. Interventional pain management measures: Patient does not take blood thinners. We have discussed diagnostic and therapeutic lumbar transforaminal epidural steroid injections (after obtaining a new MRI to determine affected levels) using the lowest effective dose of steroids, under C-arm fluoroscopic guidance, with the use of contrast dye (unless contraindicated) to confirm appropriate needle placement and spread of contrast dye. We may repeat the procedure depending on patient's outcome and following current guidelines: Epidurals will be limited to a maximum of 4 sessions per spinal region in a rolling twelve (12) month period. Continuation of  epidural steroid injections over 12 months would only be considered under the following provisions;  Patient is a high-risk surgical candidate, or the patient does not desire surgery, or recurrence of pain in the same location relieved with ESIs for at least three months and epidural provides at least 50% sustained improvement of pain and/or 50% objective improvement in function (using same scale as baseline)  Pain is severe enough to cause a significant degree of functional disability or vocational disability  The primary care provider will be notified regarding continuation of procedures and repeat steroid use   Patient will follow up with Dr. Benton thereafter    2. Diagnostic studies:   A. Lumbar X-rays, full views including flexion and extension to assess lumbar stability  B. Patient will like to defer MRI of the lumbar spine with/without contrast. I have advised him to obtain one as he had significant symptoms for longer than 3 months (and still has some difficulties)  C. We may obtain MRI of the thoracic spine without contrast to assess capacity and patency of the spinal canal and epidural space prior to spinal cord stimulator trial and implant    3. Pharmacological measures: Reviewed and discussed; Patient takes OTC. Patient denies pain at this time    4. Long-term rehabilitation efforts:  A. The patient does not have a history of falls. Also, I performed a risk assessment for falls using the Tinetti gait & balance assessment tool (scored low risk for falls).   B. Patient will start a comprehensive physical therapy program for Alter-G, water therapy, gait and balance training, neurodynamics, core strengthening, gluteal and abductor strengthening, ultrasound, ASTYM, E-STIM,  myofascial release, cupping, dry needling, home exercise program, 2-3 x per week for 8 weeks   C. Contrast therapy: Apply ice-packs for 15-20 minutes, followed by heating pads for 15-20 minutes to affected area   D. Start a low impact  exercise program such as water therapy, swimming, yoga, Pilates  E. I have prescribed a home exercise program with instructions. I have spent a significant amount of time talking with the patient and providing specific recommendations regarding lifestyle modification, preventive measures, and self-care management of chronic pain.  In addition, I have provided a copy of the booklet Care of the back by Hari Navarrete MD and Denise Mccarty MD to be used as a physician supervised home exercise program  F.  Prior to SCS: Referral to Dr. Zia Bailey for psychological screening for spinal cord stimulation and intrathecal therapies.  G. Referral to [facility]. Patient's Body mass index is 34.14 kg/m². Patient counseled on the importance of weight loss to help with overall health and pain control.   H. Chris Enciso  reports that he has never smoked. He has never used smokeless tobacco.    5. The patient has been instructed to contact my office with any questions or difficulties. The patient understands the plan and agrees to proceed accordingly.    The patient has a documented plan of care to address chronic pain. Chris Enciso reports a pain score of 1/10.  Given his pain assessment as noted, treatment options were discussed and the following options were decided upon as a follow-up plan to address the patient's pain: continuation of current treatment plan for pain, educational materials on pain management, home exercises and therapy, prescription for non-opiod analgesics, referral to Physical Therapy, referral to specialist for assistance in pain treatment guidance, steroid injections, use of non-medical modalities (ice, heat, stretching and/or behavior modifications), and interventional pain management measures .               Pain Management Panel           No data to display                 MINERVA query complete. MINERVA reviewed by Cody Arizmendi MD.     Pain Medications                ibuprofen (ADVIL,MOTRIN) 200 MG tablet 1 tablet Every 6 (Six) Hours. Not currently taking             No orders of the defined types were placed in this encounter.       Please note that portions of this note were completed with a voice recognition program.   Any copied data in any portion of my note has been reviewed by myself and accurate.     The 21st Century Cures Act makes medical notes like this available to patients in the interest of transparency. This is a medical document intended as peer to peer communication. It is written in medical language and may contain abbreviations or verbiage that are unfamiliar. It may appear blunt or direct. Medical documents are intended to carry relevant information, facts as evident, and the clinical opinion of the practitioner.     Cody Arizmedni MD    Patient Care Team:  Bethany Jones MD as PCP - General (Family Medicine)  Bethany Jones MD as Referring Physician (Family Medicine)     No orders of the defined types were placed in this encounter.        No future appointments.

## 2024-02-01 ENCOUNTER — OFFICE VISIT (OUTPATIENT)
Dept: PAIN MEDICINE | Facility: CLINIC | Age: 64
End: 2024-02-01
Payer: COMMERCIAL

## 2024-02-01 VITALS
SYSTOLIC BLOOD PRESSURE: 136 MMHG | BODY MASS INDEX: 34.24 KG/M2 | TEMPERATURE: 97.5 F | OXYGEN SATURATION: 97 % | WEIGHT: 231.2 LBS | HEIGHT: 69 IN | HEART RATE: 70 BPM | DIASTOLIC BLOOD PRESSURE: 84 MMHG

## 2024-02-01 DIAGNOSIS — R26.9 GAIT DISTURBANCE: ICD-10-CM

## 2024-02-01 DIAGNOSIS — M96.1 LUMBAR POSTLAMINECTOMY SYNDROME: ICD-10-CM

## 2024-02-01 DIAGNOSIS — M96.1 LUMBAR POSTLAMINECTOMY SYNDROME: Primary | ICD-10-CM

## 2024-02-01 DIAGNOSIS — M48.062 LUMBAR STENOSIS WITH NEUROGENIC CLAUDICATION: ICD-10-CM

## 2024-02-01 DIAGNOSIS — R53.81 PHYSICAL DECONDITIONING: ICD-10-CM

## 2024-02-01 PROCEDURE — 99204 OFFICE O/P NEW MOD 45 MIN: CPT | Performed by: ANESTHESIOLOGY

## 2024-04-05 ENCOUNTER — HOSPITAL ENCOUNTER (OUTPATIENT)
Dept: HOSPITAL 22 - RAD | Age: 64
End: 2024-04-05
Payer: COMMERCIAL

## 2024-04-05 DIAGNOSIS — R10.31: Primary | ICD-10-CM

## 2024-04-05 PROCEDURE — 76705 ECHO EXAM OF ABDOMEN: CPT

## 2025-03-10 ENCOUNTER — HOSPITAL ENCOUNTER (OUTPATIENT)
Dept: HOSPITAL 22 - RAD | Age: 65
Discharge: HOME | End: 2025-03-10
Payer: COMMERCIAL

## 2025-03-10 DIAGNOSIS — S49.91XA: ICD-10-CM

## 2025-03-10 DIAGNOSIS — M25.511: Primary | ICD-10-CM

## 2025-03-10 DIAGNOSIS — M79.672: ICD-10-CM

## 2025-03-10 PROCEDURE — 73030 X-RAY EXAM OF SHOULDER: CPT

## 2025-03-10 PROCEDURE — 73630 X-RAY EXAM OF FOOT: CPT

## 2025-03-25 ENCOUNTER — HOSPITAL ENCOUNTER (OUTPATIENT)
Age: 65
Discharge: HOME | End: 2025-03-25
Payer: COMMERCIAL

## 2025-03-25 VITALS
RESPIRATION RATE: 16 BRPM | DIASTOLIC BLOOD PRESSURE: 83 MMHG | SYSTOLIC BLOOD PRESSURE: 159 MMHG | HEART RATE: 76 BPM | OXYGEN SATURATION: 96 % | TEMPERATURE: 97.88 F

## 2025-03-25 VITALS
SYSTOLIC BLOOD PRESSURE: 131 MMHG | RESPIRATION RATE: 16 BRPM | TEMPERATURE: 96.98 F | OXYGEN SATURATION: 91 % | DIASTOLIC BLOOD PRESSURE: 79 MMHG | HEART RATE: 84 BPM

## 2025-03-25 VITALS
SYSTOLIC BLOOD PRESSURE: 128 MMHG | DIASTOLIC BLOOD PRESSURE: 75 MMHG | RESPIRATION RATE: 16 BRPM | HEART RATE: 76 BPM | OXYGEN SATURATION: 94 %

## 2025-03-25 VITALS
HEART RATE: 78 BPM | SYSTOLIC BLOOD PRESSURE: 125 MMHG | OXYGEN SATURATION: 94 % | RESPIRATION RATE: 16 BRPM | DIASTOLIC BLOOD PRESSURE: 75 MMHG

## 2025-03-25 VITALS
HEART RATE: 68 BPM | OXYGEN SATURATION: 95 % | SYSTOLIC BLOOD PRESSURE: 129 MMHG | RESPIRATION RATE: 17 BRPM | DIASTOLIC BLOOD PRESSURE: 73 MMHG

## 2025-03-25 VITALS — BODY MASS INDEX: 33.7 KG/M2

## 2025-03-25 VITALS — OXYGEN SATURATION: 96 %

## 2025-03-25 DIAGNOSIS — K63.5: Primary | ICD-10-CM

## 2025-03-25 DIAGNOSIS — Z86.0100: ICD-10-CM

## 2025-03-25 PROCEDURE — 45385 COLONOSCOPY W/LESION REMOVAL: CPT

## 2025-03-25 PROCEDURE — 0DJD8ZZ INSPECTION OF LOWER INTESTINAL TRACT, VIA NATURAL OR ARTIFICIAL OPENING ENDOSCOPIC: ICD-10-PCS | Performed by: SURGERY

## 2025-04-02 ENCOUNTER — HOSPITAL ENCOUNTER (OUTPATIENT)
Dept: HOSPITAL 22 - RAD | Age: 65
Discharge: HOME | End: 2025-04-02
Payer: COMMERCIAL

## 2025-04-02 DIAGNOSIS — R19.7: Primary | ICD-10-CM

## 2025-04-02 PROCEDURE — 74177 CT ABD & PELVIS W/CONTRAST: CPT

## 2025-04-02 RX ADMIN — SODIUM CHLORIDE, PRESERVATIVE FREE 10 ML: 5 INJECTION INTRAVENOUS at 09:01

## 2025-04-02 RX ADMIN — IOPAMIDOL 75 ML: 755 INJECTION, SOLUTION INTRAVENOUS at 09:01
